# Patient Record
Sex: MALE | Race: BLACK OR AFRICAN AMERICAN
[De-identification: names, ages, dates, MRNs, and addresses within clinical notes are randomized per-mention and may not be internally consistent; named-entity substitution may affect disease eponyms.]

---

## 2017-12-04 ENCOUNTER — HOSPITAL ENCOUNTER (INPATIENT)
Dept: HOSPITAL 74 - YASAS | Age: 42
LOS: 5 days | Discharge: HOME | DRG: 773 | End: 2017-12-09
Attending: INTERNAL MEDICINE | Admitting: INTERNAL MEDICINE
Payer: COMMERCIAL

## 2017-12-04 VITALS — BODY MASS INDEX: 32.1 KG/M2

## 2017-12-04 DIAGNOSIS — G47.00: ICD-10-CM

## 2017-12-04 DIAGNOSIS — F10.230: ICD-10-CM

## 2017-12-04 DIAGNOSIS — F11.23: Primary | ICD-10-CM

## 2017-12-04 DIAGNOSIS — F14.20: ICD-10-CM

## 2017-12-04 DIAGNOSIS — F17.210: ICD-10-CM

## 2017-12-04 LAB
APPEARANCE UR: CLEAR
BILIRUB UR STRIP.AUTO-MCNC: NEGATIVE MG/DL
COLOR UR: YELLOW
KETONES UR QL STRIP: (no result)
NITRITE UR QL STRIP: NEGATIVE
PH UR: 5 [PH] (ref 5–8)
PROT UR QL STRIP: NEGATIVE
PROT UR QL STRIP: NEGATIVE
RBC # UR STRIP: NEGATIVE /UL
SP GR UR: 1.03 (ref 1–1.03)
UROBILINOGEN UR STRIP-MCNC: 2 MG/DL (ref 0.2–1)

## 2017-12-04 PROCEDURE — HZ2ZZZZ DETOXIFICATION SERVICES FOR SUBSTANCE ABUSE TREATMENT: ICD-10-PCS | Performed by: INTERNAL MEDICINE

## 2017-12-04 RX ADMIN — Medication SCH MG: at 22:27
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
## 2017-12-05 LAB
ALBUMIN SERPL-MCNC: 4.1 G/DL (ref 3.4–5)
ALP SERPL-CCNC: 79 U/L (ref 45–117)
ALT SERPL-CCNC: 34 U/L (ref 12–78)
ANION GAP SERPL CALC-SCNC: 8 MMOL/L (ref 8–16)
AST SERPL-CCNC: 33 U/L (ref 15–37)
BILIRUB SERPL-MCNC: 1 MG/DL (ref 0.2–1)
CALCIUM SERPL-MCNC: 8.9 MG/DL (ref 8.5–10.1)
CO2 SERPL-SCNC: 27 MMOL/L (ref 21–32)
CREAT SERPL-MCNC: 1.4 MG/DL (ref 0.7–1.3)
DEPRECATED RDW RBC AUTO: 16.3 % (ref 11.9–15.9)
GLUCOSE SERPL-MCNC: 89 MG/DL (ref 74–106)
MCH RBC QN AUTO: 28.6 PG (ref 25.7–33.7)
MCHC RBC AUTO-ENTMCNC: 33.1 G/DL (ref 32–35.9)
MCV RBC: 86.3 FL (ref 80–96)
PLATELET # BLD AUTO: 220 K/MM3 (ref 134–434)
PMV BLD: 9.2 FL (ref 7.5–11.1)
PROT SERPL-MCNC: 7.5 G/DL (ref 6.4–8.2)
WBC # BLD AUTO: 10.5 K/MM3 (ref 4–10)

## 2017-12-05 RX ADMIN — Medication SCH MG: at 22:17

## 2017-12-05 RX ADMIN — MIRTAZAPINE SCH MG: 15 TABLET, FILM COATED ORAL at 22:18

## 2017-12-05 RX ADMIN — Medication SCH TAB: at 10:08

## 2017-12-05 RX ADMIN — NICOTINE SCH: 21 PATCH TRANSDERMAL at 10:09
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
## 2017-12-05 NOTE — PN
[facility] CIWA





- CIWA Score


Nausea/Vomitin-No Nausea/No Vomiting


Muscle Tremors: 3


Anxiety: 4-Mod. Anxious/Guarded


Agitation: 3


Paroxysmal Sweats: 3


Orientation: 0-Oriented


Tacttile Disturbances: 1-Very Mild Itch/Numbness


Auditory Disturbances: 3-Moderate Harsh/Frighten


Visual Disturbances: 1-Very Mild Sensitivity


Headache: 0-None Present


CIWA-Ar Total Score: 18





BHS COWS





- Scale


Resting Pulse: 1= IN 


Sweatin= Chills/Flushing


Restless Observation: 1= Difficult to Sit Still


Pupil Size: 0= Normal to Room Light


Bone or Joint Aches: 2= Severe Diffuse Aches


Runny Nose/ Eye Tearin= None


GI Upset > 30mins: 0= None


Tremor Observation of Outstretched Hands: 2= Slight Tremor Visible


Yawning Observation: 1= 1-2x During Session


Anxiety or Irritability: 2=Irritable/Anxious


Goose Flesh Skin: 3=Piloerection


COWS Score: 13





BHS Progress Note (SOAP)


Subjective: 





Anxious, Body Aches, Sweating, Constipation, Fatigue.


Objective: 


PT. A & O X 3. NO ACUTE DISTRESS.





17 14:49


 Vital Signs











Temperature  98.0 F   17 13:37


 


Pulse Rate  83   17 13:37


 


Respiratory Rate  18   17 13:37


 


Blood Pressure  107/72   17 13:37


 


O2 Sat by Pulse Oximetry (%)      








 Laboratory Tests











  17





  19:40 06:00 06:00


 


WBC   10.5 H 


 


RBC   4.99 


 


Hgb   14.3 


 


Hct   43.1 


 


MCV   86.3 


 


MCH   28.6 


 


MCHC   33.1 


 


RDW   16.3 H 


 


Plt Count   220 


 


MPV   9.2 


 


Sodium    139


 


Potassium    4.3


 


Chloride    104


 


Carbon Dioxide    27


 


Anion Gap    8


 


BUN    16


 


Creatinine    1.4 H


 


Creat Clearance w eGFR    55.05


 


Random Glucose    89


 


Calcium    8.9


 


Total Bilirubin    1.0


 


AST    33


 


ALT    34


 


Alkaline Phosphatase    79


 


Total Protein    7.5


 


Albumin    4.1


 


Urine Color  Yellow  


 


Urine Appearance  Clear  


 


Urine pH  5.0  


 


Ur Specific Gravity  1.026  


 


Urine Protein  Negative  


 


Urine Glucose (UA)  Negative  


 


Urine Ketones  Trace H  


 


Urine Blood  Negative  


 


Urine Nitrite  Negative  


 


Urine Bilirubin  Negative  


 


Urine Urobilinogen  2.0  


 


Ur Leukocyte Esterase  Negative  


 


RPR Titer   














  17





  06:00


 


WBC 


 


RBC 


 


Hgb 


 


Hct 


 


MCV 


 


MCH 


 


MCHC 


 


RDW 


 


Plt Count 


 


MPV 


 


Sodium 


 


Potassium 


 


Chloride 


 


Carbon Dioxide 


 


Anion Gap 


 


BUN 


 


Creatinine 


 


Creat Clearance w eGFR 


 


Random Glucose 


 


Calcium 


 


Total Bilirubin 


 


AST 


 


ALT 


 


Alkaline Phosphatase 


 


Total Protein 


 


Albumin 


 


Urine Color 


 


Urine Appearance 


 


Urine pH 


 


Ur Specific Gravity 


 


Urine Protein 


 


Urine Glucose (UA) 


 


Urine Ketones 


 


Urine Blood 


 


Urine Nitrite 


 


Urine Bilirubin 


 


Urine Urobilinogen 


 


Ur Leukocyte Esterase 


 


RPR Titer  Nonreactive








LABS NOTED.


Assessment: 





17 14:49


WITHDRAWAL SYMPTOMS.


Plan: 





CONTINUE DETOX.


INCREASE DAILY PO FLUID INTAKE.

## 2017-12-06 RX ADMIN — MIRTAZAPINE SCH MG: 15 TABLET, FILM COATED ORAL at 22:14

## 2017-12-06 RX ADMIN — NICOTINE SCH: 21 PATCH TRANSDERMAL at 10:05

## 2017-12-06 RX ADMIN — Medication SCH TAB: at 10:04

## 2017-12-06 RX ADMIN — METHADONE HYDROCHLORIDE SCH MG: 5 TABLET ORAL at 10:05

## 2017-12-06 RX ADMIN — Medication SCH MG: at 22:14

## 2017-12-06 NOTE — EKG
Test Reason : 

Blood Pressure : ***/*** mmHG

Vent. Rate : 083 BPM     Atrial Rate : 083 BPM

   P-R Int : 154 ms          QRS Dur : 086 ms

    QT Int : 378 ms       P-R-T Axes : 043 012 017 degrees

   QTc Int : 444 ms

 

NORMAL SINUS RHYTHM

NORMAL ECG

NO PREVIOUS ECGS AVAILABLE

Confirmed by MD Foster Daniel ([id number]) on 12/5/2017 3:04:52 PM

Also confirmed by MD Foster Daniel ([id number]),  JOSE MARIE

([id number])  on 12/6/2017 8:01:49 AM

 

Referred By:             Confirmed By:Jose Foster MD

## 2017-12-06 NOTE — PN
[facility] CIWA





- CIWA Score


Nausea/Vomitin-No Nausea/No Vomiting


Muscle Tremors: 2


Anxiety: 4-Mod. Anxious/Guarded


Agitation: 3


Paroxysmal Sweats: No Perspiration


Orientation: 0-Oriented


Tacttile Disturbances: 2-Mild Itch/Numbness/Burn


Auditory Disturbances: 1-Very Mild


Visual Disturbances: 2-Mild Sensitivity


Headache: 0-None Present


CIWA-Ar Total Score: 14





BHS COWS





- Scale


Resting Pulse: 1= [place] 


Sweatin= Chills/Flushing


Restless Observation: 0= Sits Still


Pupil Size: 0= Normal to Room Light


Bone or Joint Aches: 0= None


Runny Nose/ Eye Tearin= Nasal Congestion


GI Upset > 30mins: 1= Stomach Cramp


Tremor Observation of Outstretched Hands: 2= Slight Tremor Visible


Yawning Observation: 2= >3x During Session


Anxiety or Irritability: 1=Feels Anxious/Irritable


Goose Flesh Skin: 3=Piloerection


COWS Score: 12





BHS Progress Note (SOAP)


Subjective: 





Interrupted Sleep, Sweating, Fatigue, Tremors.


Objective: 


PT. A & O X 3. NO ACUTE DISTRESS.





17 14:03


 Vital Signs











Temperature  97.2 F L  17 13:19


 


Pulse Rate  85   17 13:19


 


Respiratory Rate  18   17 13:19


 


Blood Pressure  113/73   17 13:19


 


O2 Sat by Pulse Oximetry (%)      








 Laboratory Tests











  17





  19:40 06:00 06:00


 


WBC   10.5 H 


 


RBC   4.99 


 


Hgb   14.3 


 


Hct   43.1 


 


MCV   86.3 


 


MCH   28.6 


 


MCHC   33.1 


 


RDW   16.3 H 


 


Plt Count   220 


 


MPV   9.2 


 


Sodium    139


 


Potassium    4.3


 


Chloride    104


 


Carbon Dioxide    27


 


Anion Gap    8


 


BUN    16


 


Creatinine    1.4 H


 


Creat Clearance w eGFR    55.05


 


Random Glucose    89


 


Calcium    8.9


 


Total Bilirubin    1.0


 


AST    33


 


ALT    34


 


Alkaline Phosphatase    79


 


Total Protein    7.5


 


Albumin    4.1


 


Urine Color  Yellow  


 


Urine Appearance  Clear  


 


Urine pH  5.0  


 


Ur Specific Gravity  1.026  


 


Urine Protein  Negative  


 


Urine Glucose (UA)  Negative  


 


Urine Ketones  Trace H  


 


Urine Blood  Negative  


 


Urine Nitrite  Negative  


 


Urine Bilirubin  Negative  


 


Urine Urobilinogen  2.0  


 


Ur Leukocyte Esterase  Negative  


 


RPR Titer   














  17





  06:00


 


WBC 


 


RBC 


 


Hgb 


 


Hct 


 


MCV 


 


MCH 


 


MCHC 


 


RDW 


 


Plt Count 


 


MPV 


 


Sodium 


 


Potassium 


 


Chloride 


 


Carbon Dioxide 


 


Anion Gap 


 


BUN 


 


Creatinine 


 


Creat Clearance w eGFR 


 


Random Glucose 


 


Calcium 


 


Total Bilirubin 


 


AST 


 


ALT 


 


Alkaline Phosphatase 


 


Total Protein 


 


Albumin 


 


Urine Color 


 


Urine Appearance 


 


Urine pH 


 


Ur Specific Gravity 


 


Urine Protein 


 


Urine Glucose (UA) 


 


Urine Ketones 


 


Urine Blood 


 


Urine Nitrite 


 


Urine Bilirubin 


 


Urine Urobilinogen 


 


Ur Leukocyte Esterase 


 


RPR Titer  Nonreactive








LABS NOTED.


Assessment: 





17 14:04


WITHDRAWAL SYMPTOMS.


Plan: 





CONTINUE DETOX.


INCREASE DAILY PO FLUID INTAKE.


ENCOURAGE AMBULATION.

## 2017-12-07 RX ADMIN — Medication SCH MG: at 22:29

## 2017-12-07 RX ADMIN — METHADONE HYDROCHLORIDE SCH MG: 5 TABLET ORAL at 10:06

## 2017-12-07 RX ADMIN — MIRTAZAPINE SCH MG: 15 TABLET, FILM COATED ORAL at 22:29

## 2017-12-07 RX ADMIN — Medication SCH TAB: at 10:06

## 2017-12-07 RX ADMIN — NICOTINE SCH: 21 PATCH TRANSDERMAL at 10:07

## 2017-12-07 NOTE — PN
BHS Progress Note (SOAP)


Subjective: 





Anxious, Sweating, Body Aches, Constipation.


Objective: 


PT. A & O X 2 (UNCERTAIN ABOUT DAY / DATE). PT. OBSERVED AMBULATING ON UNIT. NO 

ACUTE DISTRESS.





12/07/17 18:59


 Vital Signs











Temperature  96.8 F L  12/07/17 17:47


 


Pulse Rate  82   12/07/17 17:47


 


Respiratory Rate  18   12/07/17 17:47


 


Blood Pressure  112/68   12/07/17 17:47


 


O2 Sat by Pulse Oximetry (%)      








 Laboratory Tests











  12/04/17 12/05/17 12/05/17





  19:40 06:00 06:00


 


WBC   10.5 H 


 


RBC   4.99 


 


Hgb   14.3 


 


Hct   43.1 


 


MCV   86.3 


 


MCH   28.6 


 


MCHC   33.1 


 


RDW   16.3 H 


 


Plt Count   220 


 


MPV   9.2 


 


Sodium    139


 


Potassium    4.3


 


Chloride    104


 


Carbon Dioxide    27


 


Anion Gap    8


 


BUN    16


 


Creatinine    1.4 H


 


Creat Clearance w eGFR    55.05


 


Random Glucose    89


 


Calcium    8.9


 


Total Bilirubin    1.0


 


AST    33


 


ALT    34


 


Alkaline Phosphatase    79


 


Total Protein    7.5


 


Albumin    4.1


 


Urine Color  Yellow  


 


Urine Appearance  Clear  


 


Urine pH  5.0  


 


Ur Specific Gravity  1.026  


 


Urine Protein  Negative  


 


Urine Glucose (UA)  Negative  


 


Urine Ketones  Trace H  


 


Urine Blood  Negative  


 


Urine Nitrite  Negative  


 


Urine Bilirubin  Negative  


 


Urine Urobilinogen  2.0  


 


Ur Leukocyte Esterase  Negative  


 


RPR Titer   














  12/05/17





  06:00


 


WBC 


 


RBC 


 


Hgb 


 


Hct 


 


MCV 


 


MCH 


 


MCHC 


 


RDW 


 


Plt Count 


 


MPV 


 


Sodium 


 


Potassium 


 


Chloride 


 


Carbon Dioxide 


 


Anion Gap 


 


BUN 


 


Creatinine 


 


Creat Clearance w eGFR 


 


Random Glucose 


 


Calcium 


 


Total Bilirubin 


 


AST 


 


ALT 


 


Alkaline Phosphatase 


 


Total Protein 


 


Albumin 


 


Urine Color 


 


Urine Appearance 


 


Urine pH 


 


Ur Specific Gravity 


 


Urine Protein 


 


Urine Glucose (UA) 


 


Urine Ketones 


 


Urine Blood 


 


Urine Nitrite 


 


Urine Bilirubin 


 


Urine Urobilinogen 


 


Ur Leukocyte Esterase 


 


RPR Titer  Nonreactive








LABS NOTED.


Assessment: 





12/07/17 19:00


WITHDRAWAL SYMPTOMS.


Plan: 





CONTINUE DETOX.

## 2017-12-08 RX ADMIN — Medication SCH TAB: at 10:08

## 2017-12-08 RX ADMIN — MIRTAZAPINE SCH MG: 15 TABLET, FILM COATED ORAL at 22:04

## 2017-12-08 RX ADMIN — Medication SCH MG: at 22:04

## 2017-12-08 RX ADMIN — NICOTINE SCH: 21 PATCH TRANSDERMAL at 10:07

## 2017-12-08 NOTE — PN
[facility] Progress Note (SOAP)


Subjective: 





Body Aches, Anxious.


Objective: 


PT. A & O X 3, OBSERVED AMBULATING ON UNIT. NO ACUTE DISTRESS.





12/08/17 15:14


 Vital Signs











Temperature  98.1 F   12/08/17 13:30


 


Pulse Rate  81   12/08/17 13:30


 


Respiratory Rate  16   12/08/17 13:30


 


Blood Pressure  125/75   12/08/17 13:30


 


O2 Sat by Pulse Oximetry (%)      








 Laboratory Tests











  12/04/17 12/05/17 12/05/17





  19:40 06:00 06:00


 


WBC   10.5 H 


 


RBC   4.99 


 


Hgb   14.3 


 


Hct   43.1 


 


MCV   86.3 


 


MCH   28.6 


 


MCHC   33.1 


 


RDW   16.3 H 


 


Plt Count   220 


 


MPV   9.2 


 


Sodium    139


 


Potassium    4.3


 


Chloride    104


 


Carbon Dioxide    27


 


Anion Gap    8


 


BUN    16


 


Creatinine    1.4 H


 


Creat Clearance w eGFR    55.05


 


Random Glucose    89


 


Calcium    8.9


 


Total Bilirubin    1.0


 


AST    33


 


ALT    34


 


Alkaline Phosphatase    79


 


Total Protein    7.5


 


Albumin    4.1


 


Urine Color  Yellow  


 


Urine Appearance  Clear  


 


Urine pH  5.0  


 


Ur Specific Gravity  1.026  


 


Urine Protein  Negative  


 


Urine Glucose (UA)  Negative  


 


Urine Ketones  Trace H  


 


Urine Blood  Negative  


 


Urine Nitrite  Negative  


 


Urine Bilirubin  Negative  


 


Urine Urobilinogen  2.0  


 


Ur Leukocyte Esterase  Negative  


 


RPR Titer   














  12/05/17





  06:00


 


WBC 


 


RBC 


 


Hgb 


 


Hct 


 


MCV 


 


MCH 


 


MCHC 


 


RDW 


 


Plt Count 


 


MPV 


 


Sodium 


 


Potassium 


 


Chloride 


 


Carbon Dioxide 


 


Anion Gap 


 


BUN 


 


Creatinine 


 


Creat Clearance w eGFR 


 


Random Glucose 


 


Calcium 


 


Total Bilirubin 


 


AST 


 


ALT 


 


Alkaline Phosphatase 


 


Total Protein 


 


Albumin 


 


Urine Color 


 


Urine Appearance 


 


Urine pH 


 


Ur Specific Gravity 


 


Urine Protein 


 


Urine Glucose (UA) 


 


Urine Ketones 


 


Urine Blood 


 


Urine Nitrite 


 


Urine Bilirubin 


 


Urine Urobilinogen 


 


Ur Leukocyte Esterase 


 


RPR Titer  Nonreactive








LABS NOTED.


Assessment: 





12/08/17 15:15


WITHDRAWAL SYMPTOMS.


Plan: 





CONTINUE DETOX.


INCREASE DAILY PO FLUID INTAKE.

## 2017-12-09 VITALS — HEART RATE: 72 BPM | SYSTOLIC BLOOD PRESSURE: 98 MMHG | DIASTOLIC BLOOD PRESSURE: 64 MMHG | TEMPERATURE: 97.2 F

## 2017-12-09 NOTE — DS
[facility] Detox Discharge Summary


Admission Date: 


12/04/17





Discharge Date: 12/09/17





- History


Present History: Alcohol Dependence, Cocaine Dependence, Opioid Dependence


Additional Comments: 





PATIENT ELECTING TO GO HOME AT THIS TIME. PATIENT REPORTS THAT HE WILL PURSUE 

AN OUTPATIENT PROGRAM FOR AFTERCARE NEAR HIS HOME IN [facility] ON HIS 

OWN AFTER DISCHARGE FROM DETOX. PATIENT WAS DISCHARGED FROM DETOX UNIT IN 

STABLE MEDICAL CONDITION.


Pertinent Past History: 





Nicotine Dependence, Insomnia, Depression.





- Physical Exam Results


Vital Signs: 


 Vital Signs











Temperature  97.2 F L  12/09/17 06:19


 


Pulse Rate  72   12/09/17 06:19


 


Respiratory Rate  18   12/09/17 06:19


 


Blood Pressure  98/64   12/09/17 06:19


 


O2 Sat by Pulse Oximetry (%)      











Pertinent Admission Physical Exam Findings: 





WITHDRAWAL SYMPTOMS.





 Laboratory Tests











  12/04/17 12/05/17 12/05/17





  19:40 06:00 06:00


 


WBC   10.5 H 


 


RBC   4.99 


 


Hgb   14.3 


 


Hct   43.1 


 


MCV   86.3 


 


MCH   28.6 


 


MCHC   33.1 


 


RDW   16.3 H 


 


Plt Count   220 


 


MPV   9.2 


 


Sodium    139


 


Potassium    4.3


 


Chloride    104


 


Carbon Dioxide    27


 


Anion Gap    8


 


BUN    16


 


Creatinine    1.4 H


 


Creat Clearance w eGFR    55.05


 


Random Glucose    89


 


Calcium    8.9


 


Total Bilirubin    1.0


 


AST    33


 


ALT    34


 


Alkaline Phosphatase    79


 


Total Protein    7.5


 


Albumin    4.1


 


Urine Color  Yellow  


 


Urine Appearance  Clear  


 


Urine pH  5.0  


 


Ur Specific Gravity  1.026  


 


Urine Protein  Negative  


 


Urine Glucose (UA)  Negative  


 


Urine Ketones  Trace H  


 


Urine Blood  Negative  


 


Urine Nitrite  Negative  


 


Urine Bilirubin  Negative  


 


Urine Urobilinogen  2.0  


 


Ur Leukocyte Esterase  Negative  


 


RPR Titer   














  12/05/17





  06:00


 


WBC 


 


RBC 


 


Hgb 


 


Hct 


 


MCV 


 


MCH 


 


MCHC 


 


RDW 


 


Plt Count 


 


MPV 


 


Sodium 


 


Potassium 


 


Chloride 


 


Carbon Dioxide 


 


Anion Gap 


 


BUN 


 


Creatinine 


 


Creat Clearance w eGFR 


 


Random Glucose 


 


Calcium 


 


Total Bilirubin 


 


AST 


 


ALT 


 


Alkaline Phosphatase 


 


Total Protein 


 


Albumin 


 


Urine Color 


 


Urine Appearance 


 


Urine pH 


 


Ur Specific Gravity 


 


Urine Protein 


 


Urine Glucose (UA) 


 


Urine Ketones 


 


Urine Blood 


 


Urine Nitrite 


 


Urine Bilirubin 


 


Urine Urobilinogen 


 


Ur Leukocyte Esterase 


 


RPR Titer  Nonreactive








LABS NOTED.





- Treatment


Hospital Course: Detox Protocol Followed, Detoxed Safely, Responded well, 

Discharged Condition Good


Patient has Accepted a Rehab Referral to: PT REPORTS THAT HE WILL PURSUE 

OUTPATIENT PROGRAM NEAR HIS HOME ON HIS OWN.





- Medication


Discharge Medications: 


Ambulatory Orders





NK [No Known Home Medication]  12/04/17 











- Diagnosis


(1) Alcohol dependence with uncomplicated withdrawal


Status: Acute   





(2) Opioid dependence with withdrawal


Status: Acute   





(3) Cocaine dependence


Status: Acute   


Qualifiers: 


   Substance use status: uncomplicated   Qualified Code(s): F14.20 - Cocaine 

dependence, uncomplicated   





(4) Insomnia


Status: Acute   


Qualifiers: 


   Insomnia type: unspecified   Qualified Code(s): G47.00 - Insomnia, 

unspecified   





(5) Nicotine dependence


Status: Acute   


Qualifiers: 


   Nicotine product type: cigarettes   Substance use status: uncomplicated   

Qualified Code(s): F17.210 - Nicotine dependence, cigarettes, uncomplicated   





- AMA


Did Patient Leave Against Medical Advice: No

## 2020-11-26 ENCOUNTER — HOSPITAL ENCOUNTER (INPATIENT)
Dept: HOSPITAL 74 - YASAS | Age: 45
LOS: 4 days | Discharge: HOME | DRG: 773 | End: 2020-11-30
Attending: ALLERGY & IMMUNOLOGY | Admitting: ALLERGY & IMMUNOLOGY
Payer: COMMERCIAL

## 2020-11-26 VITALS — BODY MASS INDEX: 28.1 KG/M2

## 2020-11-26 DIAGNOSIS — F11.23: Primary | ICD-10-CM

## 2020-11-26 DIAGNOSIS — F17.210: ICD-10-CM

## 2020-11-26 DIAGNOSIS — Z87.828: ICD-10-CM

## 2020-11-26 DIAGNOSIS — Z91.5: ICD-10-CM

## 2020-11-26 DIAGNOSIS — F10.230: ICD-10-CM

## 2020-11-26 DIAGNOSIS — F14.20: ICD-10-CM

## 2020-11-26 PROCEDURE — HZ2ZZZZ DETOXIFICATION SERVICES FOR SUBSTANCE ABUSE TREATMENT: ICD-10-PCS | Performed by: ALLERGY & IMMUNOLOGY

## 2020-11-26 PROCEDURE — C9803 HOPD COVID-19 SPEC COLLECT: HCPCS

## 2020-11-26 PROCEDURE — U0003 INFECTIOUS AGENT DETECTION BY NUCLEIC ACID (DNA OR RNA); SEVERE ACUTE RESPIRATORY SYNDROME CORONAVIRUS 2 (SARS-COV-2) (CORONAVIRUS DISEASE [COVID-19]), AMPLIFIED PROBE TECHNIQUE, MAKING USE OF HIGH THROUGHPUT TECHNOLOGIES AS DESCRIBED BY CMS-2020-01-R: HCPCS

## 2020-11-26 RX ADMIN — Medication PRN MG: at 22:18

## 2020-11-26 RX ADMIN — Medication SCH MG: at 22:18

## 2020-11-27 LAB
ALBUMIN SERPL-MCNC: 3.5 G/DL (ref 3.4–5)
ALP SERPL-CCNC: 52 U/L (ref 45–117)
ALT SERPL-CCNC: 34 U/L (ref 13–61)
ANION GAP SERPL CALC-SCNC: 6 MMOL/L (ref 8–16)
AST SERPL-CCNC: 16 U/L (ref 15–37)
BILIRUB SERPL-MCNC: 0.6 MG/DL (ref 0.2–1)
BUN SERPL-MCNC: 13.4 MG/DL (ref 7–18)
CALCIUM SERPL-MCNC: 8.2 MG/DL (ref 8.5–10.1)
CHLORIDE SERPL-SCNC: 103 MMOL/L (ref 98–107)
CO2 SERPL-SCNC: 29 MMOL/L (ref 21–32)
CREAT SERPL-MCNC: 1 MG/DL (ref 0.55–1.3)
DEPRECATED RDW RBC AUTO: 14.5 % (ref 11.9–15.9)
GLUCOSE SERPL-MCNC: 83 MG/DL (ref 74–106)
HCT VFR BLD CALC: 43.5 % (ref 35.4–49)
HGB BLD-MCNC: 14.4 GM/DL (ref 11.7–16.9)
MCH RBC QN AUTO: 29.1 PG (ref 25.7–33.7)
MCHC RBC AUTO-ENTMCNC: 33 G/DL (ref 32–35.9)
MCV RBC: 88.2 FL (ref 80–96)
PLATELET # BLD AUTO: 194 K/MM3 (ref 134–434)
PMV BLD: 8 FL (ref 7.5–11.1)
POTASSIUM SERPLBLD-SCNC: 4 MMOL/L (ref 3.5–5.1)
PROT SERPL-MCNC: 6.2 G/DL (ref 6.4–8.2)
RBC # BLD AUTO: 4.93 M/MM3 (ref 4–5.6)
SODIUM SERPL-SCNC: 138 MMOL/L (ref 136–145)
WBC # BLD AUTO: 5.1 K/MM3 (ref 4–10)

## 2020-11-27 RX ADMIN — Medication SCH MG: at 22:18

## 2020-11-27 RX ADMIN — Medication SCH TAB: at 09:58

## 2020-11-27 RX ADMIN — Medication PRN MG: at 22:18

## 2020-11-28 RX ADMIN — Medication SCH MG: at 22:18

## 2020-11-28 RX ADMIN — Medication PRN MG: at 22:17

## 2020-11-28 RX ADMIN — HYDROXYZINE PAMOATE PRN MG: 25 CAPSULE ORAL at 22:21

## 2020-11-28 RX ADMIN — Medication SCH TAB: at 10:19

## 2020-11-29 RX ADMIN — Medication SCH TAB: at 10:10

## 2020-11-29 RX ADMIN — Medication PRN MG: at 22:02

## 2020-11-29 RX ADMIN — HYDROXYZINE PAMOATE PRN MG: 25 CAPSULE ORAL at 22:03

## 2020-11-29 RX ADMIN — Medication SCH MG: at 22:02

## 2020-11-30 VITALS — TEMPERATURE: 97.3 F | DIASTOLIC BLOOD PRESSURE: 90 MMHG | HEART RATE: 69 BPM | SYSTOLIC BLOOD PRESSURE: 128 MMHG

## 2020-11-30 RX ADMIN — Medication SCH: at 11:10

## 2021-01-25 ENCOUNTER — HOSPITAL ENCOUNTER (INPATIENT)
Dept: HOSPITAL 74 - YASAS | Age: 46
LOS: 3 days | Discharge: HOME | DRG: 773 | End: 2021-01-28
Attending: ALLERGY & IMMUNOLOGY | Admitting: ALLERGY & IMMUNOLOGY
Payer: COMMERCIAL

## 2021-01-25 VITALS — BODY MASS INDEX: 29.2 KG/M2

## 2021-01-25 DIAGNOSIS — F11.23: Primary | ICD-10-CM

## 2021-01-25 DIAGNOSIS — F17.210: ICD-10-CM

## 2021-01-25 DIAGNOSIS — Z87.828: ICD-10-CM

## 2021-01-25 DIAGNOSIS — F14.20: ICD-10-CM

## 2021-01-25 DIAGNOSIS — F10.230: ICD-10-CM

## 2021-01-25 DIAGNOSIS — Z18.89: ICD-10-CM

## 2021-01-25 DIAGNOSIS — K08.89: ICD-10-CM

## 2021-01-25 PROCEDURE — U0003 INFECTIOUS AGENT DETECTION BY NUCLEIC ACID (DNA OR RNA); SEVERE ACUTE RESPIRATORY SYNDROME CORONAVIRUS 2 (SARS-COV-2) (CORONAVIRUS DISEASE [COVID-19]), AMPLIFIED PROBE TECHNIQUE, MAKING USE OF HIGH THROUGHPUT TECHNOLOGIES AS DESCRIBED BY CMS-2020-01-R: HCPCS

## 2021-01-25 PROCEDURE — HZ2ZZZZ DETOXIFICATION SERVICES FOR SUBSTANCE ABUSE TREATMENT: ICD-10-PCS | Performed by: ALLERGY & IMMUNOLOGY

## 2021-01-25 PROCEDURE — C9803 HOPD COVID-19 SPEC COLLECT: HCPCS

## 2021-01-25 RX ADMIN — Medication SCH: at 23:50

## 2021-01-25 RX ADMIN — Medication SCH: at 23:57

## 2021-01-26 LAB
ALBUMIN SERPL-MCNC: 3.5 G/DL (ref 3.4–5)
ALP SERPL-CCNC: 52 U/L (ref 45–117)
ALT SERPL-CCNC: 24 U/L (ref 13–61)
ANION GAP SERPL CALC-SCNC: 3 MMOL/L (ref 8–16)
AST SERPL-CCNC: 10 U/L (ref 15–37)
BILIRUB SERPL-MCNC: 0.4 MG/DL (ref 0.2–1)
BUN SERPL-MCNC: 12.3 MG/DL (ref 7–18)
CALCIUM SERPL-MCNC: 8.3 MG/DL (ref 8.5–10.1)
CHLORIDE SERPL-SCNC: 104 MMOL/L (ref 98–107)
CO2 SERPL-SCNC: 31 MMOL/L (ref 21–32)
CREAT SERPL-MCNC: 1.3 MG/DL (ref 0.55–1.3)
DEPRECATED RDW RBC AUTO: 14.6 % (ref 11.9–15.9)
GLUCOSE SERPL-MCNC: 80 MG/DL (ref 74–106)
HCT VFR BLD CALC: 40.9 % (ref 35.4–49)
HGB BLD-MCNC: 13.9 GM/DL (ref 11.7–16.9)
MCH RBC QN AUTO: 30 PG (ref 25.7–33.7)
MCHC RBC AUTO-ENTMCNC: 34 G/DL (ref 32–35.9)
MCV RBC: 88.3 FL (ref 80–96)
PLATELET # BLD AUTO: 225 K/MM3 (ref 134–434)
PMV BLD: 8.1 FL (ref 7.5–11.1)
POTASSIUM SERPLBLD-SCNC: 3.8 MMOL/L (ref 3.5–5.1)
PROT SERPL-MCNC: 6.3 G/DL (ref 6.4–8.2)
RBC # BLD AUTO: 4.64 M/MM3 (ref 4–5.6)
SODIUM SERPL-SCNC: 138 MMOL/L (ref 136–145)
WBC # BLD AUTO: 6 K/MM3 (ref 4–10)

## 2021-01-26 RX ADMIN — Medication SCH MG: at 22:36

## 2021-01-26 RX ADMIN — Medication SCH MG: at 22:35

## 2021-01-26 RX ADMIN — Medication SCH TAB: at 10:25

## 2021-01-27 RX ADMIN — Medication SCH MG: at 21:39

## 2021-01-27 RX ADMIN — Medication SCH TAB: at 10:04

## 2021-01-27 RX ADMIN — Medication SCH MG: at 21:38

## 2021-01-28 VITALS — HEART RATE: 85 BPM | TEMPERATURE: 98 F | SYSTOLIC BLOOD PRESSURE: 141 MMHG | DIASTOLIC BLOOD PRESSURE: 63 MMHG

## 2021-01-28 RX ADMIN — Medication SCH TAB: at 10:17

## 2021-02-26 ENCOUNTER — HOSPITAL ENCOUNTER (INPATIENT)
Dept: HOSPITAL 74 - YASAS | Age: 46
LOS: 5 days | Discharge: HOME | DRG: 773 | End: 2021-03-03
Attending: ALLERGY & IMMUNOLOGY | Admitting: ALLERGY & IMMUNOLOGY
Payer: COMMERCIAL

## 2021-02-26 VITALS — BODY MASS INDEX: 33 KG/M2

## 2021-02-26 DIAGNOSIS — R73.9: ICD-10-CM

## 2021-02-26 DIAGNOSIS — Z87.828: ICD-10-CM

## 2021-02-26 DIAGNOSIS — F19.282: ICD-10-CM

## 2021-02-26 DIAGNOSIS — F14.20: ICD-10-CM

## 2021-02-26 DIAGNOSIS — Z56.0: ICD-10-CM

## 2021-02-26 DIAGNOSIS — Z91.19: ICD-10-CM

## 2021-02-26 DIAGNOSIS — F17.210: ICD-10-CM

## 2021-02-26 DIAGNOSIS — F32.9: ICD-10-CM

## 2021-02-26 DIAGNOSIS — F11.23: Primary | ICD-10-CM

## 2021-02-26 DIAGNOSIS — F10.230: ICD-10-CM

## 2021-02-26 DIAGNOSIS — F19.24: ICD-10-CM

## 2021-02-26 LAB
ALBUMIN SERPL-MCNC: 3.6 G/DL (ref 3.4–5)
ALP SERPL-CCNC: 57 U/L (ref 45–117)
ALT SERPL-CCNC: 21 U/L (ref 13–61)
ANION GAP SERPL CALC-SCNC: 5 MMOL/L (ref 8–16)
AST SERPL-CCNC: 11 U/L (ref 15–37)
BILIRUB SERPL-MCNC: 0.4 MG/DL (ref 0.2–1)
BUN SERPL-MCNC: 17.6 MG/DL (ref 7–18)
CALCIUM SERPL-MCNC: 8.4 MG/DL (ref 8.5–10.1)
CHLORIDE SERPL-SCNC: 108 MMOL/L (ref 98–107)
CO2 SERPL-SCNC: 30 MMOL/L (ref 21–32)
CREAT SERPL-MCNC: 1.4 MG/DL (ref 0.55–1.3)
DEPRECATED RDW RBC AUTO: 14.4 % (ref 11.9–15.9)
GLUCOSE SERPL-MCNC: 93 MG/DL (ref 74–106)
HCT VFR BLD CALC: 41.1 % (ref 35.4–49)
HGB BLD-MCNC: 14 GM/DL (ref 11.7–16.9)
MCH RBC QN AUTO: 30 PG (ref 25.7–33.7)
MCHC RBC AUTO-ENTMCNC: 34 G/DL (ref 32–35.9)
MCV RBC: 88.2 FL (ref 80–96)
PLATELET # BLD AUTO: 219 K/MM3 (ref 134–434)
PMV BLD: 8.5 FL (ref 7.5–11.1)
POTASSIUM SERPLBLD-SCNC: 4.5 MMOL/L (ref 3.5–5.1)
PROT SERPL-MCNC: 6.3 G/DL (ref 6.4–8.2)
RBC # BLD AUTO: 4.66 M/MM3 (ref 4–5.6)
SODIUM SERPL-SCNC: 142 MMOL/L (ref 136–145)
WBC # BLD AUTO: 5.8 K/MM3 (ref 4–10)

## 2021-02-26 PROCEDURE — U0003 INFECTIOUS AGENT DETECTION BY NUCLEIC ACID (DNA OR RNA); SEVERE ACUTE RESPIRATORY SYNDROME CORONAVIRUS 2 (SARS-COV-2) (CORONAVIRUS DISEASE [COVID-19]), AMPLIFIED PROBE TECHNIQUE, MAKING USE OF HIGH THROUGHPUT TECHNOLOGIES AS DESCRIBED BY CMS-2020-01-R: HCPCS

## 2021-02-26 PROCEDURE — HZ2ZZZZ DETOXIFICATION SERVICES FOR SUBSTANCE ABUSE TREATMENT: ICD-10-PCS | Performed by: ALLERGY & IMMUNOLOGY

## 2021-02-26 PROCEDURE — C9803 HOPD COVID-19 SPEC COLLECT: HCPCS

## 2021-02-26 RX ADMIN — Medication SCH MG: at 22:21

## 2021-02-26 RX ADMIN — METHOCARBAMOL PRN MG: 500 TABLET ORAL at 11:31

## 2021-02-26 RX ADMIN — NICOTINE SCH: 21 PATCH TRANSDERMAL at 11:33

## 2021-02-26 RX ADMIN — Medication SCH: at 11:33

## 2021-02-26 RX ADMIN — MIRTAZAPINE SCH MG: 15 TABLET, FILM COATED ORAL at 22:21

## 2021-02-26 SDOH — ECONOMIC STABILITY - INCOME SECURITY: UNEMPLOYMENT, UNSPECIFIED: Z56.0

## 2021-02-27 RX ADMIN — MIRTAZAPINE SCH MG: 15 TABLET, FILM COATED ORAL at 23:05

## 2021-02-27 RX ADMIN — Medication SCH MG: at 23:05

## 2021-02-27 RX ADMIN — Medication SCH TAB: at 10:43

## 2021-02-27 RX ADMIN — NICOTINE SCH: 21 PATCH TRANSDERMAL at 10:45

## 2021-02-28 LAB
ANION GAP SERPL CALC-SCNC: 5 MMOL/L (ref 8–16)
BUN SERPL-MCNC: 13.3 MG/DL (ref 7–18)
CALCIUM SERPL-MCNC: 8.5 MG/DL (ref 8.5–10.1)
CHLORIDE SERPL-SCNC: 106 MMOL/L (ref 98–107)
CO2 SERPL-SCNC: 28 MMOL/L (ref 21–32)
CREAT SERPL-MCNC: 1.1 MG/DL (ref 0.55–1.3)
GLUCOSE SERPL-MCNC: 110 MG/DL (ref 74–106)
POTASSIUM SERPLBLD-SCNC: 4.2 MMOL/L (ref 3.5–5.1)
SODIUM SERPL-SCNC: 139 MMOL/L (ref 136–145)

## 2021-02-28 RX ADMIN — Medication SCH TAB: at 10:11

## 2021-02-28 RX ADMIN — Medication SCH MG: at 22:22

## 2021-02-28 RX ADMIN — NICOTINE SCH: 21 PATCH TRANSDERMAL at 10:11

## 2021-02-28 RX ADMIN — MIRTAZAPINE SCH MG: 15 TABLET, FILM COATED ORAL at 22:22

## 2021-03-01 RX ADMIN — Medication SCH TAB: at 09:24

## 2021-03-01 RX ADMIN — Medication SCH MG: at 22:19

## 2021-03-01 RX ADMIN — Medication SCH MG: at 22:20

## 2021-03-01 RX ADMIN — MIRTAZAPINE SCH MG: 15 TABLET, FILM COATED ORAL at 22:19

## 2021-03-01 RX ADMIN — NICOTINE SCH: 21 PATCH TRANSDERMAL at 09:24

## 2021-03-02 RX ADMIN — Medication SCH TAB: at 10:30

## 2021-03-02 RX ADMIN — Medication SCH MG: at 22:25

## 2021-03-02 RX ADMIN — NICOTINE SCH: 21 PATCH TRANSDERMAL at 10:31

## 2021-03-02 RX ADMIN — MIRTAZAPINE SCH MG: 15 TABLET, FILM COATED ORAL at 22:25

## 2021-03-02 RX ADMIN — METHOCARBAMOL PRN MG: 500 TABLET ORAL at 22:26

## 2021-03-02 RX ADMIN — METHOCARBAMOL PRN MG: 500 TABLET ORAL at 05:25

## 2021-03-03 VITALS — DIASTOLIC BLOOD PRESSURE: 81 MMHG | HEART RATE: 96 BPM | TEMPERATURE: 98 F | SYSTOLIC BLOOD PRESSURE: 128 MMHG

## 2021-03-23 ENCOUNTER — HOSPITAL ENCOUNTER (INPATIENT)
Dept: HOSPITAL 74 - YASAS | Age: 46
LOS: 3 days | Discharge: HOME | DRG: 773 | End: 2021-03-26
Attending: ALLERGY & IMMUNOLOGY | Admitting: ALLERGY & IMMUNOLOGY
Payer: COMMERCIAL

## 2021-03-23 VITALS — BODY MASS INDEX: 31.2 KG/M2

## 2021-03-23 DIAGNOSIS — F14.20: ICD-10-CM

## 2021-03-23 DIAGNOSIS — F10.230: Primary | ICD-10-CM

## 2021-03-23 DIAGNOSIS — Z87.828: ICD-10-CM

## 2021-03-23 DIAGNOSIS — Z18.89: ICD-10-CM

## 2021-03-23 DIAGNOSIS — F32.9: ICD-10-CM

## 2021-03-23 DIAGNOSIS — F17.210: ICD-10-CM

## 2021-03-23 DIAGNOSIS — F11.23: ICD-10-CM

## 2021-03-23 DIAGNOSIS — G47.00: ICD-10-CM

## 2021-03-23 PROCEDURE — U0003 INFECTIOUS AGENT DETECTION BY NUCLEIC ACID (DNA OR RNA); SEVERE ACUTE RESPIRATORY SYNDROME CORONAVIRUS 2 (SARS-COV-2) (CORONAVIRUS DISEASE [COVID-19]), AMPLIFIED PROBE TECHNIQUE, MAKING USE OF HIGH THROUGHPUT TECHNOLOGIES AS DESCRIBED BY CMS-2020-01-R: HCPCS

## 2021-03-23 PROCEDURE — C9803 HOPD COVID-19 SPEC COLLECT: HCPCS

## 2021-03-23 PROCEDURE — HZ2ZZZZ DETOXIFICATION SERVICES FOR SUBSTANCE ABUSE TREATMENT: ICD-10-PCS | Performed by: ALLERGY & IMMUNOLOGY

## 2021-03-23 RX ADMIN — Medication SCH MG: at 22:14

## 2021-03-24 RX ADMIN — Medication SCH TAB: at 10:08

## 2021-03-24 RX ADMIN — Medication SCH MG: at 22:34

## 2021-03-25 RX ADMIN — Medication SCH TAB: at 10:39

## 2021-03-25 RX ADMIN — Medication SCH MG: at 22:31

## 2021-03-25 RX ADMIN — Medication SCH MG: at 22:32

## 2021-03-26 VITALS — DIASTOLIC BLOOD PRESSURE: 77 MMHG | HEART RATE: 88 BPM | SYSTOLIC BLOOD PRESSURE: 120 MMHG | TEMPERATURE: 98.9 F

## 2021-03-26 RX ADMIN — Medication SCH TAB: at 10:34

## 2021-04-27 ENCOUNTER — HOSPITAL ENCOUNTER (INPATIENT)
Dept: HOSPITAL 74 - YASAS | Age: 46
LOS: 2 days | Discharge: LEFT BEFORE DISCHARGE | DRG: 770 | End: 2021-04-29
Attending: ALLERGY & IMMUNOLOGY | Admitting: ALLERGY & IMMUNOLOGY
Payer: COMMERCIAL

## 2021-04-27 VITALS — BODY MASS INDEX: 31.4 KG/M2

## 2021-04-27 DIAGNOSIS — F32.9: ICD-10-CM

## 2021-04-27 DIAGNOSIS — F19.282: ICD-10-CM

## 2021-04-27 DIAGNOSIS — F14.20: ICD-10-CM

## 2021-04-27 DIAGNOSIS — G47.00: ICD-10-CM

## 2021-04-27 DIAGNOSIS — F11.23: Primary | ICD-10-CM

## 2021-04-27 DIAGNOSIS — F10.230: ICD-10-CM

## 2021-04-27 DIAGNOSIS — F17.210: ICD-10-CM

## 2021-04-27 DIAGNOSIS — F19.24: ICD-10-CM

## 2021-04-27 PROCEDURE — U0003 INFECTIOUS AGENT DETECTION BY NUCLEIC ACID (DNA OR RNA); SEVERE ACUTE RESPIRATORY SYNDROME CORONAVIRUS 2 (SARS-COV-2) (CORONAVIRUS DISEASE [COVID-19]), AMPLIFIED PROBE TECHNIQUE, MAKING USE OF HIGH THROUGHPUT TECHNOLOGIES AS DESCRIBED BY CMS-2020-01-R: HCPCS

## 2021-04-27 PROCEDURE — C9803 HOPD COVID-19 SPEC COLLECT: HCPCS

## 2021-04-27 PROCEDURE — U0005 INFEC AGEN DETEC AMPLI PROBE: HCPCS

## 2021-04-27 PROCEDURE — HZ2ZZZZ DETOXIFICATION SERVICES FOR SUBSTANCE ABUSE TREATMENT: ICD-10-PCS | Performed by: ALLERGY & IMMUNOLOGY

## 2021-04-27 RX ADMIN — Medication SCH MG: at 22:06

## 2021-04-28 LAB
ALBUMIN SERPL-MCNC: 3.7 G/DL (ref 3.4–5)
ALP SERPL-CCNC: 59 U/L (ref 45–117)
ALT SERPL-CCNC: 22 U/L (ref 13–61)
ANION GAP SERPL CALC-SCNC: 4 MMOL/L (ref 8–16)
AST SERPL-CCNC: 13 U/L (ref 15–37)
BILIRUB SERPL-MCNC: 0.9 MG/DL (ref 0.2–1)
BUN SERPL-MCNC: 11.9 MG/DL (ref 7–18)
CALCIUM SERPL-MCNC: 8.7 MG/DL (ref 8.5–10.1)
CHLORIDE SERPL-SCNC: 104 MMOL/L (ref 98–107)
CO2 SERPL-SCNC: 29 MMOL/L (ref 21–32)
CREAT SERPL-MCNC: 1.3 MG/DL (ref 0.55–1.3)
DEPRECATED RDW RBC AUTO: 14.1 % (ref 11.9–15.9)
GLUCOSE SERPL-MCNC: 86 MG/DL (ref 74–106)
HCT VFR BLD CALC: 41.6 % (ref 35.4–49)
HGB BLD-MCNC: 14.2 GM/DL (ref 11.7–16.9)
MCH RBC QN AUTO: 30.2 PG (ref 25.7–33.7)
MCHC RBC AUTO-ENTMCNC: 34.2 G/DL (ref 32–35.9)
MCV RBC: 88.3 FL (ref 80–96)
PLATELET # BLD AUTO: 224 K/MM3 (ref 134–434)
PMV BLD: 8 FL (ref 7.5–11.1)
PROT SERPL-MCNC: 6.7 G/DL (ref 6.4–8.2)
RBC # BLD AUTO: 4.71 M/MM3 (ref 4–5.6)
SODIUM SERPL-SCNC: 138 MMOL/L (ref 136–145)
WBC # BLD AUTO: 5.9 K/MM3 (ref 4–10)

## 2021-04-28 RX ADMIN — Medication SCH TAB: at 10:29

## 2021-04-28 RX ADMIN — NICOTINE SCH: 21 PATCH TRANSDERMAL at 10:29

## 2021-04-28 RX ADMIN — Medication SCH MG: at 22:21

## 2021-04-28 RX ADMIN — Medication SCH MG: at 22:22

## 2021-04-29 VITALS — TEMPERATURE: 97.2 F | SYSTOLIC BLOOD PRESSURE: 96 MMHG | DIASTOLIC BLOOD PRESSURE: 67 MMHG | HEART RATE: 87 BPM

## 2021-04-29 RX ADMIN — Medication SCH MG: at 22:04

## 2021-04-29 RX ADMIN — Medication SCH TAB: at 10:11

## 2021-04-29 RX ADMIN — NICOTINE SCH: 21 PATCH TRANSDERMAL at 10:11

## 2021-04-29 RX ADMIN — Medication SCH MG: at 22:05

## 2021-05-12 ENCOUNTER — HOSPITAL ENCOUNTER (EMERGENCY)
Dept: HOSPITAL 74 - JER | Age: 46
LOS: 1 days | Discharge: HOME | End: 2021-05-13
Payer: COMMERCIAL

## 2021-05-12 ENCOUNTER — HOSPITAL ENCOUNTER (INPATIENT)
Dept: HOSPITAL 74 - YASAS | Age: 46
LOS: 5 days | Discharge: HOME | DRG: 773 | End: 2021-05-17
Attending: ALLERGY & IMMUNOLOGY | Admitting: ALLERGY & IMMUNOLOGY
Payer: COMMERCIAL

## 2021-05-12 VITALS — TEMPERATURE: 99.5 F

## 2021-05-12 VITALS — BODY MASS INDEX: 30.8 KG/M2

## 2021-05-12 VITALS — HEART RATE: 86 BPM | SYSTOLIC BLOOD PRESSURE: 145 MMHG | DIASTOLIC BLOOD PRESSURE: 88 MMHG

## 2021-05-12 VITALS — BODY MASS INDEX: 30.7 KG/M2

## 2021-05-12 DIAGNOSIS — R11.2: ICD-10-CM

## 2021-05-12 DIAGNOSIS — F13.230: ICD-10-CM

## 2021-05-12 DIAGNOSIS — F11.23: Primary | ICD-10-CM

## 2021-05-12 DIAGNOSIS — F17.210: ICD-10-CM

## 2021-05-12 DIAGNOSIS — F10.230: ICD-10-CM

## 2021-05-12 DIAGNOSIS — F19.24: ICD-10-CM

## 2021-05-12 DIAGNOSIS — R21: ICD-10-CM

## 2021-05-12 DIAGNOSIS — K21.9: ICD-10-CM

## 2021-05-12 LAB
ALBUMIN SERPL-MCNC: 4.5 G/DL (ref 3.4–5)
ALP SERPL-CCNC: 71 U/L (ref 45–117)
ALT SERPL-CCNC: 25 U/L (ref 13–61)
ANION GAP SERPL CALC-SCNC: 7 MMOL/L (ref 8–16)
AST SERPL-CCNC: 18 U/L (ref 15–37)
BASOPHILS # BLD: 0.4 % (ref 0–2)
BILIRUB SERPL-MCNC: 0.6 MG/DL (ref 0.2–1)
BUN SERPL-MCNC: 9.8 MG/DL (ref 7–18)
CALCIUM SERPL-MCNC: 8.9 MG/DL (ref 8.5–10.1)
CHLORIDE SERPL-SCNC: 106 MMOL/L (ref 98–107)
CO2 SERPL-SCNC: 28 MMOL/L (ref 21–32)
CREAT SERPL-MCNC: 1.1 MG/DL (ref 0.55–1.3)
DEPRECATED RDW RBC AUTO: 14.6 % (ref 11.9–15.9)
EOSINOPHIL # BLD: 0.1 % (ref 0–4.5)
GLUCOSE SERPL-MCNC: 116 MG/DL (ref 74–106)
HCT VFR BLD CALC: 44.3 % (ref 35.4–49)
HGB BLD-MCNC: 15.2 GM/DL (ref 11.7–16.9)
LYMPHOCYTES # BLD: 14.2 % (ref 8–40)
MCH RBC QN AUTO: 30.2 PG (ref 25.7–33.7)
MCHC RBC AUTO-ENTMCNC: 34.2 G/DL (ref 32–35.9)
MCV RBC: 88.2 FL (ref 80–96)
MONOCYTES # BLD AUTO: 2.4 % (ref 3.8–10.2)
NEUTROPHILS # BLD: 82.9 % (ref 42.8–82.8)
PLATELET # BLD AUTO: 285 K/MM3 (ref 134–434)
PMV BLD: 7.9 FL (ref 7.5–11.1)
PROT SERPL-MCNC: 8.1 G/DL (ref 6.4–8.2)
RBC # BLD AUTO: 5.03 M/MM3 (ref 4–5.6)
SODIUM SERPL-SCNC: 141 MMOL/L (ref 136–145)
WBC # BLD AUTO: 8.8 K/MM3 (ref 4–10)

## 2021-05-12 PROCEDURE — C9803 HOPD COVID-19 SPEC COLLECT: HCPCS

## 2021-05-12 PROCEDURE — 3E033GC INTRODUCTION OF OTHER THERAPEUTIC SUBSTANCE INTO PERIPHERAL VEIN, PERCUTANEOUS APPROACH: ICD-10-PCS

## 2021-05-12 PROCEDURE — HZ2ZZZZ DETOXIFICATION SERVICES FOR SUBSTANCE ABUSE TREATMENT: ICD-10-PCS | Performed by: ALLERGY & IMMUNOLOGY

## 2021-05-12 PROCEDURE — 3E033NZ INTRODUCTION OF ANALGESICS, HYPNOTICS, SEDATIVES INTO PERIPHERAL VEIN, PERCUTANEOUS APPROACH: ICD-10-PCS

## 2021-05-12 PROCEDURE — U0003 INFECTIOUS AGENT DETECTION BY NUCLEIC ACID (DNA OR RNA); SEVERE ACUTE RESPIRATORY SYNDROME CORONAVIRUS 2 (SARS-COV-2) (CORONAVIRUS DISEASE [COVID-19]), AMPLIFIED PROBE TECHNIQUE, MAKING USE OF HIGH THROUGHPUT TECHNOLOGIES AS DESCRIBED BY CMS-2020-01-R: HCPCS

## 2021-05-12 PROCEDURE — U0005 INFEC AGEN DETEC AMPLI PROBE: HCPCS

## 2021-05-12 RX ADMIN — Medication SCH: at 23:37

## 2021-05-13 RX ADMIN — FAMOTIDINE SCH: 20 TABLET ORAL at 10:45

## 2021-05-13 RX ADMIN — Medication SCH MG: at 23:03

## 2021-05-13 RX ADMIN — Medication SCH: at 23:06

## 2021-05-13 RX ADMIN — FAMOTIDINE SCH MG: 20 TABLET ORAL at 23:03

## 2021-05-13 RX ADMIN — Medication SCH: at 10:45

## 2021-05-13 RX ADMIN — SUCRALFATE SCH GM: 1 TABLET ORAL at 18:03

## 2021-05-14 RX ADMIN — Medication SCH MG: at 22:52

## 2021-05-14 RX ADMIN — SUCRALFATE SCH GM: 1 TABLET ORAL at 18:24

## 2021-05-14 RX ADMIN — Medication SCH: at 10:28

## 2021-05-14 RX ADMIN — FAMOTIDINE SCH MG: 20 TABLET ORAL at 10:28

## 2021-05-14 RX ADMIN — FAMOTIDINE SCH MG: 20 TABLET ORAL at 22:52

## 2021-05-15 RX ADMIN — FAMOTIDINE SCH MG: 20 TABLET ORAL at 10:49

## 2021-05-15 RX ADMIN — METHOCARBAMOL PRN MG: 500 TABLET ORAL at 18:04

## 2021-05-15 RX ADMIN — FAMOTIDINE SCH MG: 20 TABLET ORAL at 22:30

## 2021-05-15 RX ADMIN — Medication SCH TAB: at 10:49

## 2021-05-15 RX ADMIN — SUCRALFATE SCH GM: 1 TABLET ORAL at 18:04

## 2021-05-15 RX ADMIN — Medication SCH MG: at 22:30

## 2021-05-16 RX ADMIN — HYDROXYZINE PAMOATE PRN MG: 25 CAPSULE ORAL at 22:17

## 2021-05-16 RX ADMIN — FAMOTIDINE SCH MG: 20 TABLET ORAL at 10:42

## 2021-05-16 RX ADMIN — FAMOTIDINE SCH MG: 20 TABLET ORAL at 22:17

## 2021-05-16 RX ADMIN — HYDROXYZINE PAMOATE PRN MG: 25 CAPSULE ORAL at 10:42

## 2021-05-16 RX ADMIN — METHOCARBAMOL PRN MG: 500 TABLET ORAL at 22:17

## 2021-05-16 RX ADMIN — METHOCARBAMOL PRN MG: 500 TABLET ORAL at 05:54

## 2021-05-16 RX ADMIN — Medication SCH TAB: at 10:42

## 2021-05-16 RX ADMIN — Medication SCH MG: at 22:17

## 2021-05-16 RX ADMIN — SUCRALFATE SCH GM: 1 TABLET ORAL at 17:57

## 2021-05-17 VITALS — DIASTOLIC BLOOD PRESSURE: 67 MMHG | TEMPERATURE: 99.3 F | SYSTOLIC BLOOD PRESSURE: 104 MMHG | HEART RATE: 82 BPM

## 2021-06-21 ENCOUNTER — HOSPITAL ENCOUNTER (INPATIENT)
Dept: HOSPITAL 74 - YASAS | Age: 46
LOS: 3 days | Discharge: HOME | DRG: 773 | End: 2021-06-24
Attending: ALLERGY & IMMUNOLOGY | Admitting: ALLERGY & IMMUNOLOGY
Payer: COMMERCIAL

## 2021-06-21 VITALS — BODY MASS INDEX: 30.7 KG/M2

## 2021-06-21 DIAGNOSIS — F32.9: ICD-10-CM

## 2021-06-21 DIAGNOSIS — K21.9: ICD-10-CM

## 2021-06-21 DIAGNOSIS — F11.23: Primary | ICD-10-CM

## 2021-06-21 DIAGNOSIS — F17.210: ICD-10-CM

## 2021-06-21 DIAGNOSIS — F14.20: ICD-10-CM

## 2021-06-21 DIAGNOSIS — F10.230: ICD-10-CM

## 2021-06-21 DIAGNOSIS — G47.00: ICD-10-CM

## 2021-06-21 PROCEDURE — U0003 INFECTIOUS AGENT DETECTION BY NUCLEIC ACID (DNA OR RNA); SEVERE ACUTE RESPIRATORY SYNDROME CORONAVIRUS 2 (SARS-COV-2) (CORONAVIRUS DISEASE [COVID-19]), AMPLIFIED PROBE TECHNIQUE, MAKING USE OF HIGH THROUGHPUT TECHNOLOGIES AS DESCRIBED BY CMS-2020-01-R: HCPCS

## 2021-06-21 PROCEDURE — U0005 INFEC AGEN DETEC AMPLI PROBE: HCPCS

## 2021-06-21 PROCEDURE — C9803 HOPD COVID-19 SPEC COLLECT: HCPCS

## 2021-06-22 LAB
ALBUMIN SERPL-MCNC: 3.6 G/DL (ref 3.4–5)
ALP SERPL-CCNC: 62 U/L (ref 45–117)
ALT SERPL-CCNC: 106 U/L (ref 13–61)
ANION GAP SERPL CALC-SCNC: 9 MMOL/L (ref 8–16)
AST SERPL-CCNC: 47 U/L (ref 15–37)
BILIRUB SERPL-MCNC: 0.5 MG/DL (ref 0.2–1)
BUN SERPL-MCNC: 10.1 MG/DL (ref 7–18)
CALCIUM SERPL-MCNC: 8.6 MG/DL (ref 8.5–10.1)
CHLORIDE SERPL-SCNC: 106 MMOL/L (ref 98–107)
CO2 SERPL-SCNC: 25 MMOL/L (ref 21–32)
CREAT SERPL-MCNC: 1 MG/DL (ref 0.55–1.3)
DEPRECATED RDW RBC AUTO: 14.3 % (ref 11.9–15.9)
GLUCOSE SERPL-MCNC: 74 MG/DL (ref 74–106)
HCT VFR BLD CALC: 41.4 % (ref 35.4–49)
HGB BLD-MCNC: 13.7 GM/DL (ref 11.7–16.9)
MCH RBC QN AUTO: 28.8 PG (ref 25.7–33.7)
MCHC RBC AUTO-ENTMCNC: 33.1 G/DL (ref 32–35.9)
MCV RBC: 87.2 FL (ref 80–96)
PLATELET # BLD AUTO: 273 10^3/UL (ref 134–434)
PMV BLD: 7.9 FL (ref 7.5–11.1)
PROT SERPL-MCNC: 6.6 G/DL (ref 6.4–8.2)
RBC # BLD AUTO: 4.75 M/MM3 (ref 4–5.6)
SODIUM SERPL-SCNC: 140 MMOL/L (ref 136–145)
WBC # BLD AUTO: 7.5 K/MM3 (ref 4–10)

## 2021-06-22 PROCEDURE — HZ2ZZZZ DETOXIFICATION SERVICES FOR SUBSTANCE ABUSE TREATMENT: ICD-10-PCS | Performed by: ALLERGY & IMMUNOLOGY

## 2021-06-22 RX ADMIN — METHOCARBAMOL PRN MG: 500 TABLET ORAL at 18:42

## 2021-06-22 RX ADMIN — METHOCARBAMOL PRN MG: 500 TABLET ORAL at 10:09

## 2021-06-22 RX ADMIN — Medication SCH MG: at 02:32

## 2021-06-22 RX ADMIN — Medication SCH MG: at 22:46

## 2021-06-22 RX ADMIN — ONDANSETRON PRN MG: 4 TABLET, ORALLY DISINTEGRATING ORAL at 19:48

## 2021-06-22 RX ADMIN — ONDANSETRON PRN MG: 4 TABLET, ORALLY DISINTEGRATING ORAL at 13:30

## 2021-06-22 RX ADMIN — Medication SCH TAB: at 10:07

## 2021-06-22 RX ADMIN — FAMOTIDINE SCH MG: 20 TABLET ORAL at 22:46

## 2021-06-22 RX ADMIN — FAMOTIDINE SCH MG: 20 TABLET ORAL at 10:07

## 2021-06-23 RX ADMIN — Medication SCH TAB: at 10:37

## 2021-06-23 RX ADMIN — FAMOTIDINE SCH MG: 20 TABLET ORAL at 10:37

## 2021-06-23 RX ADMIN — Medication SCH MG: at 22:49

## 2021-06-23 RX ADMIN — FAMOTIDINE SCH MG: 20 TABLET ORAL at 22:49

## 2021-06-23 RX ADMIN — ONDANSETRON PRN MG: 4 TABLET, ORALLY DISINTEGRATING ORAL at 05:25

## 2021-06-24 VITALS — DIASTOLIC BLOOD PRESSURE: 92 MMHG | TEMPERATURE: 97.3 F | HEART RATE: 75 BPM | SYSTOLIC BLOOD PRESSURE: 128 MMHG

## 2021-06-24 RX ADMIN — FAMOTIDINE SCH MG: 20 TABLET ORAL at 10:17

## 2021-06-24 RX ADMIN — METHOCARBAMOL PRN MG: 500 TABLET ORAL at 10:17

## 2021-06-24 RX ADMIN — Medication SCH TAB: at 10:17

## 2023-10-15 ENCOUNTER — HOSPITAL ENCOUNTER (INPATIENT)
Dept: HOSPITAL 74 - YASAS | Age: 48
LOS: 5 days | Discharge: OTHER INSTITUTION | DRG: 773 | End: 2023-10-20
Attending: SURGERY | Admitting: ALLERGY & IMMUNOLOGY
Payer: COMMERCIAL

## 2023-10-15 VITALS — BODY MASS INDEX: 26.9 KG/M2

## 2023-10-15 DIAGNOSIS — R74.01: ICD-10-CM

## 2023-10-15 DIAGNOSIS — F11.23: Primary | ICD-10-CM

## 2023-10-15 DIAGNOSIS — F17.210: ICD-10-CM

## 2023-10-15 DIAGNOSIS — R73.9: ICD-10-CM

## 2023-10-15 DIAGNOSIS — R74.8: ICD-10-CM

## 2023-10-15 DIAGNOSIS — D72.829: ICD-10-CM

## 2023-10-15 PROCEDURE — HZ2ZZZZ DETOXIFICATION SERVICES FOR SUBSTANCE ABUSE TREATMENT: ICD-10-PCS | Performed by: SURGERY

## 2023-10-15 RX ADMIN — Medication SCH MG: at 22:12

## 2023-10-15 RX ADMIN — HYDROXYZINE PAMOATE PRN MG: 25 CAPSULE ORAL at 22:12

## 2023-10-16 ENCOUNTER — HOSPITAL ENCOUNTER (EMERGENCY)
Dept: HOSPITAL 74 - JER | Age: 48
Discharge: HOME | End: 2023-10-16
Payer: COMMERCIAL

## 2023-10-16 VITALS — RESPIRATION RATE: 20 BRPM | DIASTOLIC BLOOD PRESSURE: 76 MMHG | SYSTOLIC BLOOD PRESSURE: 141 MMHG | TEMPERATURE: 97.6 F

## 2023-10-16 VITALS — BODY MASS INDEX: 27.1 KG/M2

## 2023-10-16 VITALS — HEART RATE: 89 BPM

## 2023-10-16 DIAGNOSIS — R11.2: Primary | ICD-10-CM

## 2023-10-16 DIAGNOSIS — R25.1: ICD-10-CM

## 2023-10-16 LAB
ALBUMIN SERPL-MCNC: 4.1 G/DL (ref 3.4–5)
ALBUMIN SERPL-MCNC: 4.2 G/DL (ref 3.4–5)
ALP SERPL-CCNC: 55 U/L (ref 45–117)
ALP SERPL-CCNC: 55 U/L (ref 45–117)
ALT SERPL-CCNC: 52 U/L (ref 13–61)
ALT SERPL-CCNC: 55 U/L (ref 13–61)
ANION GAP SERPL CALC-SCNC: 7 MMOL/L (ref 4–13)
ANION GAP SERPL CALC-SCNC: 7 MMOL/L (ref 8–16)
AST SERPL-CCNC: 44 U/L (ref 15–37)
AST SERPL-CCNC: 53 U/L (ref 15–37)
BASOPHILS # BLD: 0.6 % (ref 0–2)
BILIRUB SERPL-MCNC: 1 MG/DL (ref 0.2–1)
BILIRUB SERPL-MCNC: 1.1 MG/DL (ref 0.2–1)
BUN SERPL-MCNC: 15.2 MG/DL (ref 7–18)
BUN SERPL-MCNC: 16.3 MG/DL (ref 7–18)
CALCIUM SERPL-MCNC: 8.6 MG/DL (ref 8.5–10.1)
CALCIUM SERPL-MCNC: 9 MG/DL (ref 8.5–10.1)
CHLORIDE SERPL-SCNC: 110 MMOL/L (ref 98–107)
CHLORIDE SERPL-SCNC: 114 MMOL/L (ref 98–107)
CO2 SERPL-SCNC: 25 MMOL/L (ref 21–32)
CO2 SERPL-SCNC: 26 MMOL/L (ref 21–32)
CREAT SERPL-MCNC: 1.3 MG/DL (ref 0.55–1.3)
CREAT SERPL-MCNC: 1.3 MG/DL (ref 0.55–1.3)
DEPRECATED RDW RBC AUTO: 14.1 % (ref 11.9–15.9)
DEPRECATED RDW RBC AUTO: 14.5 % (ref 11.9–15.9)
EOSINOPHIL # BLD: 0 % (ref 0–4.5)
GLUCOSE SERPL-MCNC: 118 MG/DL (ref 74–106)
GLUCOSE SERPL-MCNC: 98 MG/DL (ref 74–106)
HCT VFR BLD CALC: 42.1 % (ref 35.4–49)
HCT VFR BLD CALC: 42.4 % (ref 35.4–49)
HGB BLD-MCNC: 14.3 GM/DL (ref 11.7–16.9)
HGB BLD-MCNC: 14.8 GM/DL (ref 11.7–16.9)
LYMPHOCYTES # BLD: 13.6 % (ref 8–40)
MAGNESIUM SERPL-MCNC: 1.8 MG/DL (ref 1.8–2.4)
MCH RBC QN AUTO: 29.3 PG (ref 25.7–33.7)
MCH RBC QN AUTO: 30 PG (ref 25.7–33.7)
MCHC RBC AUTO-ENTMCNC: 33.7 G/DL (ref 32–35.9)
MCHC RBC AUTO-ENTMCNC: 35.1 G/DL (ref 32–35.9)
MCV RBC: 85.6 FL (ref 80–96)
MCV RBC: 86.9 FL (ref 80–96)
MONOCYTES # BLD AUTO: 2.6 % (ref 3.8–10.2)
NEUTROPHILS # BLD: 83.2 % (ref 42.8–82.8)
PLATELET # BLD AUTO: 252 10^3/UL (ref 134–434)
PLATELET # BLD AUTO: 265 10^3/UL (ref 134–434)
PMV BLD: 8.1 FL (ref 7.5–11.1)
PMV BLD: 8.1 FL (ref 7.5–11.1)
POTASSIUM SERPLBLD-SCNC: 3.8 MMOL/L (ref 3.5–5.1)
POTASSIUM SERPLBLD-SCNC: 4.3 MMOL/L (ref 3.5–5.1)
PROT SERPL-MCNC: 7.4 G/DL (ref 6.4–8.2)
PROT SERPL-MCNC: 7.4 G/DL (ref 6.4–8.2)
RBC # BLD AUTO: 4.89 M/MM3 (ref 4–5.6)
RBC # BLD AUTO: 4.93 M/MM3 (ref 4–5.6)
SODIUM SERPL-SCNC: 142 MMOL/L (ref 136–145)
SODIUM SERPL-SCNC: 147 MMOL/L (ref 136–145)
WBC # BLD AUTO: 10.2 K/MM3 (ref 4–10)
WBC # BLD AUTO: 9.8 K/MM3 (ref 4–10)

## 2023-10-16 PROCEDURE — 3E023GC INTRODUCTION OF OTHER THERAPEUTIC SUBSTANCE INTO MUSCLE, PERCUTANEOUS APPROACH: ICD-10-PCS | Performed by: EMERGENCY MEDICINE

## 2023-10-16 RX ADMIN — HYDROXYZINE PAMOATE PRN MG: 25 CAPSULE ORAL at 13:46

## 2023-10-16 RX ADMIN — NICOTINE SCH MG: 21 PATCH TRANSDERMAL at 10:52

## 2023-10-16 RX ADMIN — ONDANSETRON PRN MG: 4 TABLET, ORALLY DISINTEGRATING ORAL at 22:44

## 2023-10-16 RX ADMIN — Medication SCH TAB: at 10:52

## 2023-10-16 RX ADMIN — Medication SCH: at 23:07

## 2023-10-16 RX ADMIN — Medication SCH: at 22:44

## 2023-10-17 LAB
ALBUMIN SERPL-MCNC: 4.3 G/DL (ref 3.4–5)
ALP SERPL-CCNC: 62 U/L (ref 45–117)
ALT SERPL-CCNC: 55 U/L (ref 13–61)
ANION GAP SERPL CALC-SCNC: 10 MMOL/L (ref 4–13)
AST SERPL-CCNC: 52 U/L (ref 15–37)
BASOPHILS # BLD: 0.5 % (ref 0–2)
BILIRUB SERPL-MCNC: 1.2 MG/DL (ref 0.2–1)
BUN SERPL-MCNC: 18.6 MG/DL (ref 7–18)
CALCIUM SERPL-MCNC: 9 MG/DL (ref 8.5–10.1)
CHLORIDE SERPL-SCNC: 103 MMOL/L (ref 98–107)
CO2 SERPL-SCNC: 25 MMOL/L (ref 21–32)
CREAT SERPL-MCNC: 1.3 MG/DL (ref 0.55–1.3)
DEPRECATED RDW RBC AUTO: 14.5 % (ref 11.9–15.9)
EOSINOPHIL # BLD: 0 % (ref 0–4.5)
GLUCOSE SERPL-MCNC: 137 MG/DL (ref 74–106)
HCT VFR BLD CALC: 49.9 % (ref 35.4–49)
HGB BLD-MCNC: 16.7 GM/DL (ref 11.7–16.9)
LYMPHOCYTES # BLD: 17.6 % (ref 8–40)
MCH RBC QN AUTO: 29.4 PG (ref 25.7–33.7)
MCHC RBC AUTO-ENTMCNC: 33.5 G/DL (ref 32–35.9)
MCV RBC: 87.7 FL (ref 80–96)
MONOCYTES # BLD AUTO: 5.8 % (ref 3.8–10.2)
NEUTROPHILS # BLD: 76.1 % (ref 42.8–82.8)
PLATELET # BLD AUTO: 262 10^3/UL (ref 134–434)
PMV BLD: 8.2 FL (ref 7.5–11.1)
POTASSIUM SERPLBLD-SCNC: 4 MMOL/L (ref 3.5–5.1)
PROT SERPL-MCNC: 7.8 G/DL (ref 6.4–8.2)
RBC # BLD AUTO: 5.69 M/MM3 (ref 4–5.6)
SODIUM SERPL-SCNC: 138 MMOL/L (ref 136–145)
WBC # BLD AUTO: 12.5 K/MM3 (ref 4–10)

## 2023-10-17 RX ADMIN — METHOCARBAMOL PRN MG: 500 TABLET ORAL at 22:25

## 2023-10-17 RX ADMIN — HYDROXYZINE PAMOATE PRN MG: 25 CAPSULE ORAL at 22:25

## 2023-10-17 RX ADMIN — Medication SCH TAB: at 10:24

## 2023-10-17 RX ADMIN — NICOTINE SCH: 21 PATCH TRANSDERMAL at 10:26

## 2023-10-17 RX ADMIN — METHOCARBAMOL PRN MG: 500 TABLET ORAL at 10:25

## 2023-10-17 RX ADMIN — Medication SCH MG: at 22:25

## 2023-10-17 RX ADMIN — Medication SCH MG: at 22:26

## 2023-10-17 RX ADMIN — HYDROXYZINE PAMOATE PRN MG: 25 CAPSULE ORAL at 10:25

## 2023-10-17 RX ADMIN — ONDANSETRON PRN MG: 4 TABLET, ORALLY DISINTEGRATING ORAL at 12:32

## 2023-10-18 LAB
APPEARANCE UR: CLEAR
BACTERIA # UR AUTO: 22 /UL (ref 0–1359)
BILIRUB UR STRIP.AUTO-MCNC: NEGATIVE MG/DL
CASTS URNS QL MICRO: 4 /UL (ref 0–3.1)
COLOR UR: YELLOW
EPITH CASTS URNS QL MICRO: 11 /UL (ref 0–25.1)
KETONES UR QL STRIP: (no result)
LEUKOCYTE ESTERASE UR QL STRIP.AUTO: NEGATIVE
NITRITE UR QL STRIP: NEGATIVE
PH UR: 6 [PH] (ref 5–8)
PROT UR QL STRIP: (no result)
PROT UR QL STRIP: NEGATIVE
RBC # BLD AUTO: 13 /UL (ref 0–23.9)
SP GR UR: 1.02 (ref 1.01–1.03)
UROBILINOGEN UR STRIP-MCNC: 1 MG/DL (ref 0.2–1)
WBC # UR AUTO: 67.2 /UL (ref 0–25.8)

## 2023-10-18 RX ADMIN — HYDROXYZINE PAMOATE PRN MG: 25 CAPSULE ORAL at 09:55

## 2023-10-18 RX ADMIN — NICOTINE SCH: 21 PATCH TRANSDERMAL at 09:56

## 2023-10-18 RX ADMIN — METHOCARBAMOL PRN MG: 500 TABLET ORAL at 22:30

## 2023-10-18 RX ADMIN — METHOCARBAMOL PRN MG: 500 TABLET ORAL at 09:55

## 2023-10-18 RX ADMIN — Medication SCH MG: at 22:28

## 2023-10-18 RX ADMIN — Medication SCH MG: at 22:29

## 2023-10-18 RX ADMIN — Medication SCH TAB: at 09:55

## 2023-10-19 LAB
BASOPHILS # BLD: 0.7 % (ref 0–2)
DEPRECATED RDW RBC AUTO: 14.4 % (ref 11.9–15.9)
EOSINOPHIL # BLD: 1.1 % (ref 0–4.5)
HCT VFR BLD CALC: 45.4 % (ref 35.4–49)
HGB BLD-MCNC: 15.8 GM/DL (ref 11.7–16.9)
LYMPHOCYTES # BLD: 39.7 % (ref 8–40)
MCH RBC QN AUTO: 29.8 PG (ref 25.7–33.7)
MCHC RBC AUTO-ENTMCNC: 34.7 G/DL (ref 32–35.9)
MCV RBC: 85.9 FL (ref 80–96)
MONOCYTES # BLD AUTO: 6.3 % (ref 3.8–10.2)
NEUTROPHILS # BLD: 52.2 % (ref 42.8–82.8)
PLATELET # BLD AUTO: 223 10^3/UL (ref 134–434)
PMV BLD: 8.3 FL (ref 7.5–11.1)
RBC # BLD AUTO: 5.29 M/MM3 (ref 4–5.6)
WBC # BLD AUTO: 11 K/MM3 (ref 4–10)

## 2023-10-19 RX ADMIN — Medication SCH: at 23:07

## 2023-10-19 RX ADMIN — HYDROXYZINE PAMOATE PRN MG: 25 CAPSULE ORAL at 10:32

## 2023-10-19 RX ADMIN — Medication SCH MG: at 22:34

## 2023-10-19 RX ADMIN — NICOTINE SCH: 21 PATCH TRANSDERMAL at 10:33

## 2023-10-19 RX ADMIN — METHOCARBAMOL PRN MG: 500 TABLET ORAL at 10:32

## 2023-10-19 RX ADMIN — Medication SCH APPLIC: at 12:06

## 2023-10-19 RX ADMIN — Medication SCH APPLIC: at 18:19

## 2023-10-19 RX ADMIN — Medication SCH TAB: at 10:32

## 2023-10-20 VITALS
DIASTOLIC BLOOD PRESSURE: 71 MMHG | RESPIRATION RATE: 17 BRPM | SYSTOLIC BLOOD PRESSURE: 105 MMHG | HEART RATE: 90 BPM | TEMPERATURE: 98.1 F

## 2023-10-20 RX ADMIN — NICOTINE SCH: 21 PATCH TRANSDERMAL at 09:31

## 2023-10-20 RX ADMIN — Medication SCH: at 09:31

## 2023-10-20 RX ADMIN — Medication SCH APPLIC: at 05:55

## 2025-02-18 ENCOUNTER — HOSPITAL ENCOUNTER (INPATIENT)
Dept: HOSPITAL 74 - YASAS | Age: 50
LOS: 3 days | Discharge: LEFT BEFORE DISCHARGE | DRG: 770 | End: 2025-02-21
Attending: ALLERGY & IMMUNOLOGY | Admitting: ALLERGY & IMMUNOLOGY
Payer: COMMERCIAL

## 2025-02-18 VITALS — BODY MASS INDEX: 28.1 KG/M2

## 2025-02-18 DIAGNOSIS — F14.20: ICD-10-CM

## 2025-02-18 DIAGNOSIS — Z56.0: ICD-10-CM

## 2025-02-18 DIAGNOSIS — F10.230: Primary | ICD-10-CM

## 2025-02-18 DIAGNOSIS — F17.210: ICD-10-CM

## 2025-02-18 DIAGNOSIS — G47.00: ICD-10-CM

## 2025-02-18 DIAGNOSIS — F11.23: ICD-10-CM

## 2025-02-18 DIAGNOSIS — F19.24: ICD-10-CM

## 2025-02-18 DIAGNOSIS — F12.20: ICD-10-CM

## 2025-02-18 DIAGNOSIS — F43.10: ICD-10-CM

## 2025-02-18 DIAGNOSIS — Z59.00: ICD-10-CM

## 2025-02-18 PROCEDURE — HZ2ZZZZ DETOXIFICATION SERVICES FOR SUBSTANCE ABUSE TREATMENT: ICD-10-PCS | Performed by: ALLERGY & IMMUNOLOGY

## 2025-02-18 RX ADMIN — TUBERCULIN PURIFIED PROTEIN DERIVATIVE ONE TU: 5 INJECTION, SOLUTION INTRADERMAL at 17:16

## 2025-02-18 RX ADMIN — Medication SCH MG: at 22:08

## 2025-02-18 RX ADMIN — Medication SCH: at 15:31

## 2025-02-18 RX ADMIN — FAMOTIDINE SCH MG: 20 TABLET ORAL at 22:08

## 2025-02-18 SDOH — ECONOMIC STABILITY - INCOME SECURITY: UNEMPLOYMENT, UNSPECIFIED: Z56.0

## 2025-02-18 SDOH — ECONOMIC STABILITY - HOUSING INSECURITY: HOMELESSNESS UNSPECIFIED: Z59.00

## 2025-02-19 LAB
ALBUMIN SERPL-MCNC: 3.5 G/DL (ref 3.4–5)
ALP SERPL-CCNC: 62 U/L (ref 45–117)
ALT SERPL-CCNC: 17 U/L (ref 13–61)
ANION GAP SERPL CALC-SCNC: 5 MMOL/L (ref 4–13)
AST SERPL-CCNC: 12 U/L (ref 15–37)
BILIRUB SERPL-MCNC: 0.6 MG/DL (ref 0.2–1)
BUN SERPL-MCNC: 16.3 MG/DL (ref 7–18)
CALCIUM SERPL-MCNC: 8.6 MG/DL (ref 8.5–10.1)
CHLORIDE SERPL-SCNC: 104 MMOL/L (ref 98–107)
CO2 SERPL-SCNC: 29 MMOL/L (ref 21–32)
CREAT SERPL-MCNC: 1 MG/DL (ref 0.55–1.3)
DEPRECATED RDW RBC AUTO: 15.1 % (ref 11.9–15.9)
GLUCOSE SERPL-MCNC: 82 MG/DL (ref 74–106)
HCT VFR BLD CALC: 41 % (ref 35.4–49)
HGB BLD-MCNC: 13.6 GM/DL (ref 11.7–16.9)
MCH RBC QN AUTO: 28.5 PG (ref 25.7–33.7)
MCHC RBC AUTO-ENTMCNC: 33.2 G/DL (ref 32–35.9)
MCV RBC: 85.9 FL (ref 80–96)
PLATELET # BLD AUTO: 222 10^3/UL (ref 134–434)
PMV BLD: 8.2 FL (ref 7.5–11.1)
POTASSIUM SERPLBLD-SCNC: 4.4 MMOL/L (ref 3.5–5.1)
PROT SERPL-MCNC: 6.3 G/DL (ref 6.4–8.2)
RBC # BLD AUTO: 4.77 M/MM3 (ref 4–5.6)
SODIUM SERPL-SCNC: 139 MMOL/L (ref 136–145)
WBC # BLD AUTO: 4.3 K/MM3 (ref 4–10)

## 2025-02-19 RX ADMIN — INFLUENZA VIRUS VACCINE ONE: 15; 15; 15 SUSPENSION INTRAMUSCULAR at 11:43

## 2025-02-21 VITALS
HEART RATE: 86 BPM | RESPIRATION RATE: 18 BRPM | DIASTOLIC BLOOD PRESSURE: 88 MMHG | SYSTOLIC BLOOD PRESSURE: 126 MMHG | TEMPERATURE: 97.5 F